# Patient Record
Sex: FEMALE | Race: WHITE | NOT HISPANIC OR LATINO | URBAN - METROPOLITAN AREA
[De-identification: names, ages, dates, MRNs, and addresses within clinical notes are randomized per-mention and may not be internally consistent; named-entity substitution may affect disease eponyms.]

---

## 2018-11-05 ENCOUNTER — EMERGENCY (EMERGENCY)
Facility: HOSPITAL | Age: 68
LOS: 1 days | Discharge: HOME | End: 2018-11-05
Attending: EMERGENCY MEDICINE
Payer: MEDICARE

## 2018-11-05 VITALS
HEART RATE: 76 BPM | SYSTOLIC BLOOD PRESSURE: 118 MMHG | WEIGHT: 130.07 LBS | DIASTOLIC BLOOD PRESSURE: 75 MMHG | TEMPERATURE: 98 F | RESPIRATION RATE: 18 BRPM | HEIGHT: 61 IN | OXYGEN SATURATION: 95 %

## 2018-11-05 VITALS
DIASTOLIC BLOOD PRESSURE: 60 MMHG | OXYGEN SATURATION: 98 % | RESPIRATION RATE: 17 BRPM | TEMPERATURE: 98 F | HEART RATE: 73 BPM | SYSTOLIC BLOOD PRESSURE: 116 MMHG

## 2018-11-05 PROCEDURE — 73120 X-RAY EXAM OF HAND: CPT

## 2018-11-05 PROCEDURE — 12002 RPR S/N/AX/GEN/TRNK2.6-7.5CM: CPT

## 2018-11-05 PROCEDURE — 99283 EMERGENCY DEPT VISIT LOW MDM: CPT | Mod: 25

## 2018-11-05 PROCEDURE — 90715 TDAP VACCINE 7 YRS/> IM: CPT

## 2018-11-05 PROCEDURE — 90471 IMMUNIZATION ADMIN: CPT

## 2018-11-05 PROCEDURE — 73120 X-RAY EXAM OF HAND: CPT | Mod: 26,RT

## 2018-11-05 RX ORDER — TETANUS TOXOID, REDUCED DIPHTHERIA TOXOID AND ACELLULAR PERTUSSIS VACCINE, ADSORBED 5; 2.5; 8; 8; 2.5 [IU]/.5ML; [IU]/.5ML; UG/.5ML; UG/.5ML; UG/.5ML
0.5 SUSPENSION INTRAMUSCULAR ONCE
Qty: 0 | Refills: 0 | Status: COMPLETED | OUTPATIENT
Start: 2018-11-05 | End: 2018-11-05

## 2018-11-05 RX ADMIN — TETANUS TOXOID, REDUCED DIPHTHERIA TOXOID AND ACELLULAR PERTUSSIS VACCINE, ADSORBED 0.5 MILLILITER(S): 5; 2.5; 8; 8; 2.5 SUSPENSION INTRAMUSCULAR at 13:06

## 2018-11-05 NOTE — ED PROVIDER NOTE - MEDICAL DECISION MAKING DETAILS
neurovascularly intact. 3rd ffinger numbness resolves when pressure relieved by the other hand. Will XR to r/o FB and lac repair, tetanus shot, dc neurovascularly intact. 3rd ffinger numbness resolves when pressure relieved by the other hand. Will XR to r/o FB and lac repair, tetanus shot, dc      Attending note. Laceration to ulnar aspect of the right hand. X-ray to rule out foreign body. Wound care and suture.

## 2018-11-05 NOTE — ED ADULT NURSE NOTE - CAS TRG GEN SKIN CONDITION
Quality 226: Preventive Care And Screening: Tobacco Use: Screening And Cessation Intervention: Patient screened for tobacco use and is an ex/non-smoker
Detail Level: Detailed
Dry/Warm

## 2018-11-05 NOTE — ED PROVIDER NOTE - NS ED ROS FT
ROS: denies HA, weakness, dizziness, fevers/chills, nausea/vomiting, chest pain, SOB, diaphoresis, abdominal pain, diarrhea, joint pain, neuro deficits, dysuria/hematuria, rash    +R hand lac

## 2018-11-05 NOTE — ED ADULT NURSE NOTE - NSIMPLEMENTINTERV_GEN_ALL_ED
Implemented All Universal Safety Interventions:  [place] to call system. Call bell, personal items and telephone within reach. Instruct patient to call for assistance. Room bathroom lighting operational. Non-slip footwear when patient is off stretcher. Physically safe environment: no spills, clutter or unnecessary equipment. Stretcher in lowest position, wheels locked, appropriate side rails in place.

## 2018-11-05 NOTE — ED PROVIDER NOTE - PHYSICAL EXAMINATION
Gen: Well appearing, NAD  Head: NCAT  HEENT: PERRL, MMM, normal conjunctiva, anicteric, neck supple  Lung: CTAB, no adventitious sounds  CV: RRR, no murmurs, rubs or gallops  Abd: soft, NTND, no rebound or guarding, no CVAT  MSK: 3cm lac over hypothenar eminence over the R hand. +numbness over 3rd distal finger tip  Neuro: No focal neurologic deficits. CN II-XII grossly intact. 5/5 strength and normal sensation in all extremities.  Skin: Warm and dry, no evidence of rash  Psych: normal mood and affect Gen: Well appearing, NAD  Head: NCAT  HEENT: PERRL, MMM, normal conjunctiva, anicteric, neck supple  Lung: CTAB, no adventitious sounds  CV: RRR, no murmurs, rubs or gallops  Abd: soft, NTND, no rebound or guarding, no CVAT  MSK: 3cm lac over hypothenar eminence over the R hand. +numbness over 3rd distal finger tip  Neuro: No focal neurologic deficits. CN II-XII grossly intact. 5/5 strength and normal sensation in all extremities.  Skin: Warm and dry, no evidence of rash  Psych: normal mood and affect     Attending note. Patient is alert and in no acute distress. Patient has a previous centimeter laceration on the volar aspect of the right hand at the palm. Sensation is intact and normal. Tendons are intact when stressed. There is no visible foreign bodies.

## 2018-11-05 NOTE — ED PROVIDER NOTE - OBJECTIVE STATEMENT
69yo F with hx of HTN on aspirin presents with lac on R medial palm over hypothenar eminence. Was cleaning fishbowl and it shattered on her hand. Tetanus unknown. denies any other complaints other than lac/pain over the R hand at this time. 69yo F with hx of HTN on aspirin presents with lac on R medial palm over hypothenar eminence. Was cleaning fishbowl and it shattered on her hand. Tetanus unknown. denies any other complaints other than lac/pain over the R hand at this time.       Attending note. Patient was seen in the fast track room #1. Patient was cleaning a fish tank which broke causing glass to shatter and lacerated the ulnar aspect of the right hand. Patient is right-hand dominant. Patient had transient paresthesia in the tip of the right middle finger which has resolved. Patient requires tetanus prophylaxis.

## 2018-11-05 NOTE — ED ADULT NURSE NOTE - OBJECTIVE STATEMENT
68 year old female A&OX4 presents with a 4 cm laceration to the right palm after dropping a fish tank on her hand. Patient controlled bleeding with direct pressure but after bandage removal, laceration actively bleeding. Bandages reapplied. Patient does not remember last tetanus administration.

## 2018-11-05 NOTE — ED PROVIDER NOTE - PROGRESS NOTE DETAILS
3rd fingertip numbness resolves when stop putting pressure over the hand R hand lac repaired without complications. pt agrees to discharge. will f/u with PMD for suture removal in 7-10 days

## 2019-01-28 ENCOUNTER — EMERGENCY (EMERGENCY)
Facility: HOSPITAL | Age: 69
LOS: 1 days | Discharge: HOME | End: 2019-01-28
Attending: EMERGENCY MEDICINE
Payer: COMMERCIAL

## 2019-01-28 VITALS
HEIGHT: 61 IN | OXYGEN SATURATION: 97 % | SYSTOLIC BLOOD PRESSURE: 122 MMHG | RESPIRATION RATE: 16 BRPM | WEIGHT: 139.99 LBS | HEART RATE: 69 BPM | DIASTOLIC BLOOD PRESSURE: 80 MMHG | TEMPERATURE: 98 F

## 2019-01-28 PROCEDURE — 93010 ELECTROCARDIOGRAM REPORT: CPT

## 2019-01-28 PROCEDURE — 99284 EMERGENCY DEPT VISIT MOD MDM: CPT | Mod: 25

## 2019-01-29 VITALS
DIASTOLIC BLOOD PRESSURE: 69 MMHG | TEMPERATURE: 98 F | HEART RATE: 77 BPM | SYSTOLIC BLOOD PRESSURE: 112 MMHG | OXYGEN SATURATION: 95 % | RESPIRATION RATE: 18 BRPM

## 2019-01-29 PROBLEM — I10 ESSENTIAL (PRIMARY) HYPERTENSION: Chronic | Status: ACTIVE | Noted: 2018-11-05

## 2019-01-29 LAB
ALBUMIN SERPL ELPH-MCNC: 4.6 G/DL — SIGNIFICANT CHANGE UP (ref 3.3–5)
ALP SERPL-CCNC: 61 U/L — SIGNIFICANT CHANGE UP (ref 40–120)
ALT FLD-CCNC: 19 U/L — SIGNIFICANT CHANGE UP (ref 10–45)
ANION GAP SERPL CALC-SCNC: 16 MMOL/L — SIGNIFICANT CHANGE UP (ref 5–17)
APPEARANCE UR: CLEAR — SIGNIFICANT CHANGE UP
AST SERPL-CCNC: 12 U/L — SIGNIFICANT CHANGE UP (ref 10–40)
BACTERIA # UR AUTO: ABNORMAL
BASOPHILS # BLD AUTO: 0.2 K/UL — SIGNIFICANT CHANGE UP (ref 0–0.2)
BASOPHILS NFR BLD AUTO: 1.6 % — SIGNIFICANT CHANGE UP (ref 0–2)
BILIRUB SERPL-MCNC: 0.6 MG/DL — SIGNIFICANT CHANGE UP (ref 0.2–1.2)
BILIRUB UR-MCNC: NEGATIVE — SIGNIFICANT CHANGE UP
BUN SERPL-MCNC: 20 MG/DL — SIGNIFICANT CHANGE UP (ref 7–23)
CALCIUM SERPL-MCNC: 10 MG/DL — SIGNIFICANT CHANGE UP (ref 8.4–10.5)
CHLORIDE SERPL-SCNC: 98 MMOL/L — SIGNIFICANT CHANGE UP (ref 96–108)
CO2 SERPL-SCNC: 26 MMOL/L — SIGNIFICANT CHANGE UP (ref 22–31)
COLOR SPEC: COLORLESS — SIGNIFICANT CHANGE UP
CREAT SERPL-MCNC: 0.76 MG/DL — SIGNIFICANT CHANGE UP (ref 0.5–1.3)
DIFF PNL FLD: ABNORMAL
EOSINOPHIL # BLD AUTO: 0.5 K/UL — SIGNIFICANT CHANGE UP (ref 0–0.5)
EOSINOPHIL NFR BLD AUTO: 4.3 % — SIGNIFICANT CHANGE UP (ref 0–6)
EPI CELLS # UR: 8 /HPF — HIGH
GAS PNL BLDV: SIGNIFICANT CHANGE UP
GLUCOSE SERPL-MCNC: 96 MG/DL — SIGNIFICANT CHANGE UP (ref 70–99)
GLUCOSE UR QL: NEGATIVE — SIGNIFICANT CHANGE UP
HCT VFR BLD CALC: 44.1 % — SIGNIFICANT CHANGE UP (ref 34.5–45)
HGB BLD-MCNC: 15.3 G/DL — SIGNIFICANT CHANGE UP (ref 11.5–15.5)
HYALINE CASTS # UR AUTO: 0 /LPF — SIGNIFICANT CHANGE UP (ref 0–2)
KETONES UR-MCNC: NEGATIVE — SIGNIFICANT CHANGE UP
LEUKOCYTE ESTERASE UR-ACNC: ABNORMAL
LIDOCAIN IGE QN: 33 U/L — SIGNIFICANT CHANGE UP (ref 7–60)
LYMPHOCYTES # BLD AUTO: 49.7 % — HIGH (ref 13–44)
LYMPHOCYTES # BLD AUTO: 5.8 K/UL — HIGH (ref 1–3.3)
MAGNESIUM SERPL-MCNC: 1.9 MG/DL — SIGNIFICANT CHANGE UP (ref 1.6–2.6)
MCHC RBC-ENTMCNC: 32.9 PG — SIGNIFICANT CHANGE UP (ref 27–34)
MCHC RBC-ENTMCNC: 34.8 GM/DL — SIGNIFICANT CHANGE UP (ref 32–36)
MCV RBC AUTO: 94.7 FL — SIGNIFICANT CHANGE UP (ref 80–100)
MONOCYTES # BLD AUTO: 0.6 K/UL — SIGNIFICANT CHANGE UP (ref 0–0.9)
MONOCYTES NFR BLD AUTO: 5.6 % — SIGNIFICANT CHANGE UP (ref 2–14)
NEUTROPHILS # BLD AUTO: 4.5 K/UL — SIGNIFICANT CHANGE UP (ref 1.8–7.4)
NEUTROPHILS NFR BLD AUTO: 38.9 % — LOW (ref 43–77)
NITRITE UR-MCNC: NEGATIVE — SIGNIFICANT CHANGE UP
PH UR: 7 — SIGNIFICANT CHANGE UP (ref 5–8)
PHOSPHATE SERPL-MCNC: 3.5 MG/DL — SIGNIFICANT CHANGE UP (ref 2.5–4.5)
PLATELET # BLD AUTO: 442 K/UL — HIGH (ref 150–400)
POTASSIUM SERPL-MCNC: 3.2 MMOL/L — LOW (ref 3.5–5.3)
POTASSIUM SERPL-SCNC: 3.2 MMOL/L — LOW (ref 3.5–5.3)
PROT SERPL-MCNC: 7.9 G/DL — SIGNIFICANT CHANGE UP (ref 6–8.3)
PROT UR-MCNC: NEGATIVE — SIGNIFICANT CHANGE UP
RBC # BLD: 4.66 M/UL — SIGNIFICANT CHANGE UP (ref 3.8–5.2)
RBC # FLD: 12.6 % — SIGNIFICANT CHANGE UP (ref 10.3–14.5)
RBC CASTS # UR COMP ASSIST: 3 /HPF — SIGNIFICANT CHANGE UP (ref 0–4)
SODIUM SERPL-SCNC: 140 MMOL/L — SIGNIFICANT CHANGE UP (ref 135–145)
SP GR SPEC: 1.02 — SIGNIFICANT CHANGE UP (ref 1.01–1.02)
TSH SERPL-MCNC: 0.28 UIU/ML — SIGNIFICANT CHANGE UP (ref 0.27–4.2)
UROBILINOGEN FLD QL: NEGATIVE — SIGNIFICANT CHANGE UP
WBC # BLD: 11.7 K/UL — HIGH (ref 3.8–10.5)
WBC # FLD AUTO: 11.7 K/UL — HIGH (ref 3.8–10.5)
WBC UR QL: 14 /HPF — HIGH (ref 0–5)

## 2019-01-29 PROCEDURE — 74177 CT ABD & PELVIS W/CONTRAST: CPT

## 2019-01-29 PROCEDURE — 82947 ASSAY GLUCOSE BLOOD QUANT: CPT

## 2019-01-29 PROCEDURE — 82330 ASSAY OF CALCIUM: CPT

## 2019-01-29 PROCEDURE — 80053 COMPREHEN METABOLIC PANEL: CPT

## 2019-01-29 PROCEDURE — 84443 ASSAY THYROID STIM HORMONE: CPT

## 2019-01-29 PROCEDURE — 83735 ASSAY OF MAGNESIUM: CPT

## 2019-01-29 PROCEDURE — 85027 COMPLETE CBC AUTOMATED: CPT

## 2019-01-29 PROCEDURE — 82435 ASSAY OF BLOOD CHLORIDE: CPT

## 2019-01-29 PROCEDURE — 85014 HEMATOCRIT: CPT

## 2019-01-29 PROCEDURE — 71046 X-RAY EXAM CHEST 2 VIEWS: CPT

## 2019-01-29 PROCEDURE — 84100 ASSAY OF PHOSPHORUS: CPT

## 2019-01-29 PROCEDURE — 81001 URINALYSIS AUTO W/SCOPE: CPT

## 2019-01-29 PROCEDURE — 74177 CT ABD & PELVIS W/CONTRAST: CPT | Mod: 26

## 2019-01-29 PROCEDURE — 99284 EMERGENCY DEPT VISIT MOD MDM: CPT | Mod: 25

## 2019-01-29 PROCEDURE — 84295 ASSAY OF SERUM SODIUM: CPT

## 2019-01-29 PROCEDURE — 83690 ASSAY OF LIPASE: CPT

## 2019-01-29 PROCEDURE — 82803 BLOOD GASES ANY COMBINATION: CPT

## 2019-01-29 PROCEDURE — 93005 ELECTROCARDIOGRAM TRACING: CPT

## 2019-01-29 PROCEDURE — 83605 ASSAY OF LACTIC ACID: CPT

## 2019-01-29 PROCEDURE — 71046 X-RAY EXAM CHEST 2 VIEWS: CPT | Mod: 26

## 2019-01-29 PROCEDURE — 84132 ASSAY OF SERUM POTASSIUM: CPT

## 2019-01-29 RX ORDER — CEPHALEXIN 500 MG
1 CAPSULE ORAL
Qty: 14 | Refills: 0 | OUTPATIENT
Start: 2019-01-29 | End: 2019-02-04

## 2019-01-29 RX ORDER — ONDANSETRON 8 MG/1
4 TABLET, FILM COATED ORAL ONCE
Qty: 0 | Refills: 0 | Status: DISCONTINUED | OUTPATIENT
Start: 2019-01-29 | End: 2019-02-01

## 2019-01-29 RX ORDER — SODIUM CHLORIDE 9 MG/ML
1000 INJECTION, SOLUTION INTRAVENOUS ONCE
Qty: 0 | Refills: 0 | Status: COMPLETED | OUTPATIENT
Start: 2019-01-29 | End: 2019-01-29

## 2019-01-29 RX ORDER — CEPHALEXIN 500 MG
500 CAPSULE ORAL EVERY 12 HOURS
Qty: 0 | Refills: 0 | Status: DISCONTINUED | OUTPATIENT
Start: 2019-01-29 | End: 2019-02-01

## 2019-01-29 RX ADMIN — SODIUM CHLORIDE 1000 MILLILITER(S): 9 INJECTION, SOLUTION INTRAVENOUS at 02:53

## 2019-01-29 RX ADMIN — SODIUM CHLORIDE 1000 MILLILITER(S): 9 INJECTION, SOLUTION INTRAVENOUS at 00:38

## 2019-01-29 RX ADMIN — Medication 500 MILLIGRAM(S): at 05:23

## 2019-01-29 NOTE — ED PROVIDER NOTE - OBJECTIVE STATEMENT
67y/o c/o nausea/vomiting since last wed (1/23); went to her PCP on Friday (azithromycin, now completed 5 days, not feeling better); c/o chills, shivering, hot flashes, diarrhea until Sunday; today unable to eat or drink. unable to get up.  No measured fevers.    Pain throughout abdomen, associated with vomiting.     smoker for 40 years (quit two weeks ago)    meds: amlodipine, indapamide, propranolol, donepezol, advil 400mg bid 69y/o c/o nausea/vomiting since last wed (1/23); went to her PCP on Friday (azithromycin, now completed 5 days, not feeling better); c/o chills, shivering, hot flashes, diarrhea until Sunday; today unable to eat or drink. unable to get up.  No measured fevers.  Vomits anytime she tries to eat solids, but is able to keep small sips down (larger / gulps, also vomits).      Pain throughout abdomen, associated with vomiting.     smoker for 40 years (quit two weeks ago)    meds: amlodipine, indapamide, propranolol, donepezol, advil 400mg bid

## 2019-01-29 NOTE — ED PROVIDER NOTE - MEDICAL DECISION MAKING DETAILS
68F, h.o HTN, c/o worsening weakness and nausea 1wk. dx w/ gastritis s/p azithr, vomits with food, decreased po, no fever, chestpain, shortness of breath. 40+ smoking year, concerning for neoplasm, will get lab and CT ap, cxr and UA dispo pending on results

## 2019-01-29 NOTE — ED ADULT NURSE NOTE - OBJECTIVE STATEMENT
Pt presents to the ED with complaint of nausea and vomiting, AXOX3, Polish speaking, daughter at bedside translating, reports nausea and vomiting since last wednesday, was seen by her doctor last friday and given course of abx which was completed. Pt denies improvement, reports weakness and very little to no PO intake, reports vomiting with PO intake. Generalized abdominal pain, nontender, nondistended, diarrhea reported, ceased on sunday, chills and hot flashes at home, No chest pain, no shortness of breath, skins color normal for age and race, moving all extremities, no dysuria or hematuria.

## 2019-01-29 NOTE — ED PROVIDER NOTE - NSFOLLOWUPINSTRUCTIONS_ED_ALL_ED_FT
Thank you for visiting our Emergency Department, it has been a pleasure taking part in your healthcare.    Your discharge diagnosis is: Urinary tract infection and vomiting  Please take Keflex 500mg twice a day for 7 days and follow-up with your primary care doctor.     A copy of resulted labs, imaging, and findings have been provided to you.   You have had a detailed discussion with your provider regarding your diagnosis, management and discharge plan. You have been given the opportunity to have your questions answered. At this time you have been deemed stable and fit for discharge.    Return precautions to the Emergency Department include but are not limited to: unrelenting nausea, vomiting, fever, chills, chest pain, shortness of breath, dizziness, abdominal pain, worsening pain, syncope, blood in urine or stool, headache that doesn't resolve, numbness or tingling, loss of sensation, loss of motor function, or any other concerning symptoms.

## 2019-01-29 NOTE — ED PROVIDER NOTE - CARE PLAN
Principal Discharge DX:	Acute cystitis without hematuria  Secondary Diagnosis:	Nausea and vomiting, intractability of vomiting not specified, unspecified vomiting type

## 2019-01-29 NOTE — ED PROVIDER NOTE - PROGRESS NOTE DETAILS
patient is able to tolerate PO, and will treat for UTI, other labs unremarkable Attending note (Vaughn): Patient improved after iv fluids; now tolerating po; can continue evaluation via PCP as no emergent findings on CT or CXR; unclear source of symptoms; possible UTI on UA, will give antibiotic; strict return precautions advised.

## 2023-08-12 NOTE — ED ADULT TRIAGE NOTE - HEART RATE (BEATS/MIN)
SW/CM ED Assessment/Plan of Care Note     Baseline Assessment  Tere Mansfield is a 89 year old female  presented to the ED with   Chief Complaint   Patient presents with   • Syncope   .  Prior to arrival patient was living with Children and residing at a town home..  Patient does have a Power of  for Healthcare.  Document is not activated.  Agent is Devang Mansfield  (Son) [phone] . Patient’s Primary Care Provider is Keyanna Koch MD.     Medical History  Past Medical History:   Diagnosis Date   • Essential (primary) hypertension    • History of creation of ostomy (CMD)        Prior to Arrival Status  Functional Status  Ambulation: Walker  Bathing: Personal care worker, Independent/Self  Dressing: Independent/Self, Personal care worker  Toileting: Independent/Self  Meal Preparation: Family, Personal care worker  Shopping: Family, Personal care worker  Medication Preparation: Personal care worker, Family  Medication Administration: Independent/Self  Housekeeping: Personal care worker  Laundry: Personal care worker  Laundry Location: Main Level  Transportation: Family, Personal care worker    Agency/Support  Type of Services Prior to Hospitalization: Family Caregiver (paid), Other (comment) ([facility] comes monthly)     Support Systems: Family members  Home Devices/Equipment: Mobility assist device, Shower chair, Blood pressure monitor  Mobility Assist Devices: Front-wheeled walker  Sensory Support Devices: None    Insurance  Primary: MEDICARE  Secondary: [facility]    Barriers to Discharge  Identified Barriers to Discharge/Transition Planning: Pending diagnostic results/procedure     Progress Note  Tere Mansfield is a 89 year old female here for a pre syncopal episode and generalized weakness.Weekend caregiver Arabella ([phone]) said she was acting abnormally, did not respond to questions and looked like she was going to pass out. Patient has a weekday caregiver plus  another on the weekend. She receives visits from  [facility] on a monthly basis. 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 Once here labs showed elevated troponin and creatinine. Patient also has R.Increased interstitial opacities. Patient being admitted for further work up and management    Plan of Care  Plan of care reviewed with Patient  Plan to admit: {Yes  If YES:   Patient class recommended: Inpatient  Handoff to INPT CM completed (sticky note): Yes      SW/CM - Recommendations for Discharge:  Continuing home care services   Anticipate patient will need post-hospital services. Necessary servicesare available.  Anticipate patient cannot return to the environment from which patient entered the hospital.   Anticipate patient can provide some self-care at discharge.     76